# Patient Record
Sex: FEMALE | URBAN - METROPOLITAN AREA
[De-identification: names, ages, dates, MRNs, and addresses within clinical notes are randomized per-mention and may not be internally consistent; named-entity substitution may affect disease eponyms.]

---

## 2023-08-07 ENCOUNTER — TELEPHONE (OUTPATIENT)
Dept: RHEUMATOLOGY | Facility: CLINIC | Age: 88
End: 2023-08-07

## 2023-08-07 NOTE — TELEPHONE ENCOUNTER
----- Message from Teresa Aguillon sent at 8/7/2023  9:36 AM CDT -----  Regarding: Medication  Patient called with a medication update. Her Gabapentin dosage was increased from 100 to 200 mg. While on the 200 mg dosage she experienced shaking and diarrhea. She has since went back to the 100 mg dosage and is doing much better. Please Advise.      No